# Patient Record
Sex: FEMALE | Race: BLACK OR AFRICAN AMERICAN | NOT HISPANIC OR LATINO | URBAN - METROPOLITAN AREA
[De-identification: names, ages, dates, MRNs, and addresses within clinical notes are randomized per-mention and may not be internally consistent; named-entity substitution may affect disease eponyms.]

---

## 2017-12-22 ENCOUNTER — EMERGENCY (EMERGENCY)
Facility: HOSPITAL | Age: 42
LOS: 1 days | Discharge: HOME | End: 2017-12-22
Attending: EMERGENCY MEDICINE | Admitting: EMERGENCY MEDICINE
Payer: OTHER MISCELLANEOUS

## 2017-12-22 VITALS
TEMPERATURE: 98 F | DIASTOLIC BLOOD PRESSURE: 89 MMHG | SYSTOLIC BLOOD PRESSURE: 141 MMHG | HEART RATE: 80 BPM | OXYGEN SATURATION: 100 % | RESPIRATION RATE: 14 BRPM

## 2017-12-22 VITALS
HEART RATE: 98 BPM | OXYGEN SATURATION: 100 % | SYSTOLIC BLOOD PRESSURE: 147 MMHG | RESPIRATION RATE: 16 BRPM | DIASTOLIC BLOOD PRESSURE: 81 MMHG | TEMPERATURE: 98 F

## 2017-12-22 PROCEDURE — 99284 EMERGENCY DEPT VISIT MOD MDM: CPT | Mod: 25

## 2017-12-22 PROCEDURE — 99053 MED SERV 10PM-8AM 24 HR FAC: CPT

## 2017-12-22 RX ORDER — ERYTHROMYCIN BASE 5 MG/GRAM
1 OINTMENT (GRAM) OPHTHALMIC (EYE)
Qty: 1 | Refills: 0
Start: 2017-12-22 | End: 2017-12-28

## 2017-12-22 RX ORDER — ACETAMINOPHEN 500 MG
650 TABLET ORAL ONCE
Qty: 0 | Refills: 0 | Status: COMPLETED | OUTPATIENT
Start: 2017-12-22 | End: 2017-12-22

## 2017-12-22 RX ORDER — CIPROFLOXACIN HCL 0.3 %
2 DROPS OPHTHALMIC (EYE)
Qty: 1 | Refills: 0
Start: 2017-12-22 | End: 2017-12-28

## 2017-12-22 RX ORDER — CIPROFLOXACIN HCL 0.3 %
2 DROPS OPHTHALMIC (EYE) ONCE
Qty: 0 | Refills: 0 | Status: COMPLETED | OUTPATIENT
Start: 2017-12-22 | End: 2017-12-22

## 2017-12-22 RX ADMIN — Medication 300 MILLIGRAM(S): at 04:42

## 2017-12-22 RX ADMIN — Medication 2 DROP(S): at 04:42

## 2017-12-22 RX ADMIN — Medication 650 MILLIGRAM(S): at 04:42

## 2017-12-22 NOTE — ED PROVIDER NOTE - PHYSICAL EXAMINATION
normal slit lamp, normal IOP <20, no FB on eyelid eversion, normal fluoroscein, no pain w/ light shining, +conjunctival injection. PERRL, EOMI. +L superior and inferior eyelid swelling, no erythema/warmth. +Fake eyelashes L eye.

## 2017-12-22 NOTE — ED PROVIDER NOTE - ATTENDING CONTRIBUTION TO CARE
42F PMH DM p/w L eye pain/drainage/redness and eyelid swelling since sleeping with contacts. No other systemic complaints. Vitals wnl, exam as above.  ddx: conjunctivitis, possible pre-septal cellulitis. Clinically not orbital cellulitis. Normal acuity and IOP. Clinically no indication for emergent ophtho consult.  Will tx w/ ciprodex, clinda. Pt understands importance of following up w/ ophtho in 1-2 days. Tylenol prn.   Comfortable for dc.

## 2017-12-22 NOTE — ED ADULT TRIAGE NOTE - CHIEF COMPLAINT QUOTE
c/o left eye redness, irritation and swelling since last night. no purulent drainage. "just clear tears." denies vision change or any other symptoms.

## 2017-12-22 NOTE — ED ADULT NURSE NOTE - OBJECTIVE STATEMENT
Pt received to room 24 AA&OX3 c/o left eye swelling, tearing and pain. Pt reports recently eye lash extension. Denies trauma. No labs or IV access needed. Vital signs noted. Will continue to monitor.

## 2017-12-22 NOTE — ED PROVIDER NOTE - MEDICAL DECISION MAKING DETAILS
42yof contact lens wearer w/ left eye pain, foreign body sensation, periorbital edema. Likely conjunctivitis, +/- preseptal cellulitis. No pain or resistance to retropulsion, and no systemic complaints to suggest orbital cellulitis. keratitis is on the differential given pt's contact lens wearing but there are no opacities or ulcerations over the sclera. Will treat w/ PO and topical antibiotics, pt will see her ophthalmologist today. Provided w/ clinic number as well for urgent follow up.

## 2019-05-10 ENCOUNTER — APPOINTMENT (OUTPATIENT)
Dept: ORTHOPEDIC SURGERY | Facility: CLINIC | Age: 44
End: 2019-05-10
Payer: COMMERCIAL

## 2019-05-10 PROCEDURE — 99204 OFFICE O/P NEW MOD 45 MIN: CPT

## 2019-05-10 PROCEDURE — 72170 X-RAY EXAM OF PELVIS: CPT

## 2019-05-10 PROCEDURE — 72100 X-RAY EXAM L-S SPINE 2/3 VWS: CPT

## 2019-05-10 RX ORDER — OXYCODONE HYDROCHLORIDE 5 MG/1
5 CAPSULE ORAL
Qty: 42 | Refills: 0 | Status: DISCONTINUED | COMMUNITY
Start: 2019-05-10 | End: 2019-05-10

## 2019-05-13 ENCOUNTER — APPOINTMENT (OUTPATIENT)
Dept: ORTHOPEDIC SURGERY | Facility: CLINIC | Age: 44
End: 2019-05-13
Payer: COMMERCIAL

## 2019-05-13 VITALS
BODY MASS INDEX: 35.68 KG/M2 | DIASTOLIC BLOOD PRESSURE: 86 MMHG | HEART RATE: 77 BPM | SYSTOLIC BLOOD PRESSURE: 120 MMHG | WEIGHT: 189 LBS | HEIGHT: 61 IN

## 2019-05-13 DIAGNOSIS — Z78.9 OTHER SPECIFIED HEALTH STATUS: ICD-10-CM

## 2019-05-13 DIAGNOSIS — Z86.39 PERSONAL HISTORY OF OTHER ENDOCRINE, NUTRITIONAL AND METABOLIC DISEASE: ICD-10-CM

## 2019-05-13 DIAGNOSIS — M51.26 OTHER INTERVERTEBRAL DISC DISPLACEMENT, LUMBAR REGION: ICD-10-CM

## 2019-05-13 PROCEDURE — 99215 OFFICE O/P EST HI 40 MIN: CPT

## 2019-05-14 ENCOUNTER — OTHER (OUTPATIENT)
Age: 44
End: 2019-05-14

## 2019-05-15 NOTE — PHYSICAL EXAM
[de-identified] : On general examination the patient is adequately groomed and nourished. The vital parameters are as recorded. \par There is no lymphedema or diffuse swelling, no varicose veins, no skin warmth/erythema/scars/swelling, no ulcers and no palpable lymph nodes or masses in both lower extremities. Bilateral pedal pulses are well palpable.\par Upper Extremity:\par Both right and left upper extremities are unremarkable in terms of skin rash, lesions, pigmentation, redness, tenderness, swelling, joint instability, abnormal deformity or crepitus. The overall range of motion, sensation, motor tone and strength testing are normal.\par \par Spine Exam:\par There is no curvature of the spine with loss of normal lumbar lordosis secondary to spasm. The skin is devoid of erythema, scars, pigmentation or rashes. There is mild lumbar spasm and tenderness especially at L4-L5 or L5-S1. \par The range of motion of the lumbar spine is limited by pain and spasm. Forward flexion is 70% normal, extension catch positive, lateral flexion and rotation 70% normal. There is no lumbar spine imbalance or instability detected.\par Bilateral passive SLR is right 40 degrees, left 40 degrees in supine and sitting positions. +SLR left sided.  \par Neurology:\par The patient is alert and oriented in person, place and time. The mood is calm and affect is normal.\par Testing for coordination including Rhomberg's Test and Finger-Nose Test, sensation, motor tone and power and deep tendon reflexes in both lower extremities is normal.\par  [de-identified] : The following radiographs were ordered and read by me during this patients visit. I reviewed each radiograph in detail with the patient and discussed the findings as highlighted below. \par AP view of the pelvis is within normal limits. \par AP and lateral views of the lumbar spine reveal DJD L5S1 with loss of normal lordosis\par \par

## 2019-05-15 NOTE — DISCUSSION/SUMMARY
[de-identified] : Lumbar spine spasm,possible herniation. \par The patient was informed of the findings and recommended conservative management in the form of a home exercise program, activity modifications. I have recommended an MRI of the lumbar spine to rule out HNP. \par A prescription for non-steroidal anti-inflammatory medication - diclofenac,muscle relaxer - Cyclobenzaprine, as well as Tramadol and oxycodone for pain as needed was providend and the risks, benefits and side effects were discussed.\par I have provided the patient with a referral to Dr. Siddiqi for evaluation of the lumbar spine. She does not require routine follow up with me. \par \par

## 2019-05-15 NOTE — HISTORY OF PRESENT ILLNESS
[de-identified] : Ms. VIVIEN HIGHTOWER is a 43 year old female presents with lower back pain, intermittent for many years. She notes it would come and go, and usually resolve in a day or two, but notes that about One week ago, she bent over to  a case of water, and could not stand back up. She admits to continued severe pain to the lower back, with radiation to the left LE. She states she has had tingling in the finger tips and toes, but notes she has a history of diabetes, and is not sure if it is related to that. She denies loss of sensation, loss of balance. She states that her only comfortable position is laying prone. She denies bowel and bladder incontinence. She was seen in the ED following the exacerbation of pain last week, and she was provided with prescriptions for Tramadol, ibuprofen and methocarbamol. She has been taking the medications and notes very little relief.  [Worsening] : worsening [7] : a current pain level of 7/10 [Walking] : worsened by walking [Rest] : relieved by rest

## 2019-10-29 ENCOUNTER — OUTPATIENT (OUTPATIENT)
Dept: OUTPATIENT SERVICES | Facility: HOSPITAL | Age: 44
LOS: 1 days | End: 2019-10-29
Payer: COMMERCIAL

## 2019-10-29 ENCOUNTER — APPOINTMENT (OUTPATIENT)
Dept: BARIATRICS | Facility: CLINIC | Age: 44
End: 2019-10-29
Payer: COMMERCIAL

## 2019-10-29 VITALS
HEIGHT: 61 IN | DIASTOLIC BLOOD PRESSURE: 80 MMHG | BODY MASS INDEX: 34.13 KG/M2 | OXYGEN SATURATION: 99 % | HEART RATE: 83 BPM | WEIGHT: 180.78 LBS | SYSTOLIC BLOOD PRESSURE: 118 MMHG

## 2019-10-29 DIAGNOSIS — E11.9 TYPE 2 DIABETES MELLITUS WITHOUT COMPLICATIONS: ICD-10-CM

## 2019-10-29 DIAGNOSIS — Z87.898 PERSONAL HISTORY OF OTHER SPECIFIED CONDITIONS: ICD-10-CM

## 2019-10-29 DIAGNOSIS — D25.9 LEIOMYOMA OF UTERUS, UNSPECIFIED: ICD-10-CM

## 2019-10-29 DIAGNOSIS — R14.1 GAS PAIN: ICD-10-CM

## 2019-10-29 DIAGNOSIS — E66.01 MORBID (SEVERE) OBESITY DUE TO EXCESS CALORIES: ICD-10-CM

## 2019-10-29 DIAGNOSIS — Z98.84 BARIATRIC SURGERY STATUS: ICD-10-CM

## 2019-10-29 PROCEDURE — 74220 X-RAY XM ESOPHAGUS 1CNTRST: CPT

## 2019-10-29 PROCEDURE — 99204 OFFICE O/P NEW MOD 45 MIN: CPT | Mod: 25

## 2019-10-29 PROCEDURE — 74220 X-RAY XM ESOPHAGUS 1CNTRST: CPT | Mod: 26

## 2019-10-29 PROCEDURE — S2083 ADJUSTMENT GASTRIC BAND: CPT

## 2019-10-30 NOTE — HISTORY OF PRESENT ILLNESS
[Procedure: ___] : Procedure performed: [unfilled]  [de-identified] : 43 year old female s/p LAGB presents today with dysphagia and emesis with solids and gassy. Over the past month, she developed worsening dysphagia to solids and now mostly having soups and soft foods including carbs. Also, patient reports occasional acid reflux symptoms and denies nocturnal reflux. She is having adequate water daily. She was in [facility] ED last month with chest pain and had it was determined to be gas pain. She was seen by Bariatric team and no fluid was removed from lap band at that time. Patient is concerned that the lap band might need to be taken out and if it does, can it be done in conjunction with abdominal hysterectomy for uterine fibroids and abdominoplasty. VE performed today.

## 2019-10-30 NOTE — REVIEW OF SYSTEMS
[Recent Change In Weight] : ~T recent weight change [Dysphagia] : dysphagia [Abdominal Pain] : abdominal pain [Vomiting] : vomiting [Constipation] : constipation [Reflux/Heartburn] : reflux/heartburn [Negative] : Psychiatric [Fever] : no fever [Chills] : no chills [Night Sweats] : no night sweats [Fatigue] : no fatigue [Eye Pain] : no eye pain [Red Eyes] : eyes not red [Vision Problems] : no vision problems [Hoarseness] : no hoarseness [Odynophagia] : no odynophagia [Chest Pain] : no chest pain [Palpitations] : no palpitations [Leg Claudication] : no intermittent leg claudication [Shortness Of Breath] : no shortness of breath [Wheezing] : no wheezing [Cough] : no cough [FreeTextEntry2] : weight loss

## 2019-10-30 NOTE — PROCEDURE
[FreeTextEntry1] : LAP-BAND adjustment was performed today. Using standard sterile technique, a 20-gauge Stein needle was inserted into the LAB-BAND port.  This was accessed with a single puncture. 5 ml were aspirated and 5 ml removed. The resulting net volume remains 0 ml. \par After the procedure, the patient was seated erect and tolerated water test without symptoms of reflux, dysphagia or regurgitation. \par Routine post adjustment nutritional counseling was provided.  This patient should adhere to a liquid diet for the next 24-48 hours advancing one stage per day to regular as tolerated. \par

## 2019-10-30 NOTE — ASSESSMENT
[FreeTextEntry1] : 3 year old female s/p LAGB presents today with dysphagia and emesis with solids and gassy. VE showed no prolapse or obstruction. However given symptoms, a COMPLETE lap band DEFLATION was performed today without any complications. Patient tolerated procedure and post water test. Routine post adjustment nutritional counseling was provided.  This patient should adhere to a liquid diet for the next 24-48 hours advancing one stage per day to regular as tolerated. The patient was encouraged to continue a low fat, low carbohydrate and high protein diet and limit grazing. \par \par Nutrition and exercise counseling provided\par Follow up in 1 month or sooner if there is no improvement in symptoms\par Call with any questions or concerns\par

## 2019-10-30 NOTE — PHYSICAL EXAM
[Obese, well nourished, in no acute distress] : obese, well nourished, in no acute distress [Normal] : affect appropriate [de-identified] : EOMI, anicteric  [de-identified] : Soft and nontender, no hepatosplenomegaly, masses or hernias appreciated. Lap-band port site without erythema or tenderness.

## 2019-12-06 NOTE — ED PROVIDER NOTE - OBJECTIVE STATEMENT
42yof w/ DM2 p/w left eye pain and swelling. Detail Level: Detailed 42yof w/ DM2 p/w left eye pain and swelling. Gradual onset yesterday of irritation, foreign body sensation and redness of the left eye which the patient initially attributed to sleeping with her contacts in. This evening she began to have increasing pain and swelling to the left eyelid, now associated with photophobia and tearing. Denies loss of visual acuity, painful eye movements, retroorbital pressure. Denies any fevers, chills or other systemic complaints. Denies any trauma to the eye. 42yof w/ DM2 p/w left eye pain and swelling. Gradual onset yesterday of irritation, foreign body sensation and redness of the left eye which the patient initially attributed to sleeping with her contacts in. This evening she began to have increasing pain and swelling to the left eyelid, now associated with photophobia and tearing. Denies loss of visual acuity, painful eye movements, retroorbital pressure. Denies any fevers, chills or other systemic complaints. Denies any trauma to the eye.   [place]: 42F PMH DM p/w L eye pain. Slept in contacts and removed them yesterday morning. Now w/ increasing pain to L eye, increased tearing. Also increased eyelid swelling. no visual changes, f/c, rashes, joint pains, trauma.

## 2020-01-28 ENCOUNTER — APPOINTMENT (OUTPATIENT)
Dept: BARIATRICS | Facility: CLINIC | Age: 45
End: 2020-01-28
Payer: COMMERCIAL

## 2020-01-28 VITALS
OXYGEN SATURATION: 98 % | DIASTOLIC BLOOD PRESSURE: 90 MMHG | WEIGHT: 183.42 LBS | HEIGHT: 61 IN | HEART RATE: 82 BPM | SYSTOLIC BLOOD PRESSURE: 130 MMHG | BODY MASS INDEX: 34.63 KG/M2

## 2020-01-28 DIAGNOSIS — Z87.898 PERSONAL HISTORY OF OTHER SPECIFIED CONDITIONS: ICD-10-CM

## 2020-01-28 DIAGNOSIS — Z87.19 PERSONAL HISTORY OF OTHER DISEASES OF THE DIGESTIVE SYSTEM: ICD-10-CM

## 2020-01-28 DIAGNOSIS — E66.9 OBESITY, UNSPECIFIED: ICD-10-CM

## 2020-01-28 PROCEDURE — 99214 OFFICE O/P EST MOD 30 MIN: CPT

## 2020-01-28 RX ORDER — CYCLOBENZAPRINE HYDROCHLORIDE 5 MG/1
5 TABLET, FILM COATED ORAL 3 TIMES DAILY
Qty: 60 | Refills: 0 | Status: DISCONTINUED | COMMUNITY
Start: 2019-05-10 | End: 2020-01-28

## 2020-01-28 RX ORDER — TRAMADOL HYDROCHLORIDE 50 MG/1
50 TABLET, COATED ORAL
Qty: 21 | Refills: 1 | Status: DISCONTINUED | COMMUNITY
Start: 2019-05-10 | End: 2020-01-28

## 2020-01-28 RX ORDER — OXYCODONE 5 MG/1
5 TABLET ORAL
Qty: 42 | Refills: 0 | Status: DISCONTINUED | OUTPATIENT
Start: 2019-05-10 | End: 2020-01-28

## 2020-02-10 PROBLEM — E66.9 OBESE: Status: ACTIVE | Noted: 2020-01-28

## 2020-02-12 NOTE — ASSESSMENT
[FreeTextEntry1] : 44 year old female s/p LAGB presents today to discuss possible lap band removal in conjunction with abdominal hysterectomy for uterine fibroids and abdominoplasty. Since patient is not having any issues related to the lap band, it was recommended that she NOT have it removed at this time. With regard to utilizing the lap band, it was suggested that she food journal and make nutritionist appointment. The patient was encouraged to have a low fat, low carbohydrate and high protein diet including three meals and limit grazing.\par \par Nutrition and exercise counseling provided\par Food journal and nutritionist appointment\par Follow up in 2-3 months\par Call with any questions or concerns\par

## 2020-02-12 NOTE — REVIEW OF SYSTEMS
[Recent Change In Weight] : ~T recent weight change [Negative] : Psychiatric [Fever] : no fever [Chills] : no chills [Fatigue] : no fatigue [Night Sweats] : no night sweats [Eye Pain] : no eye pain [Red Eyes] : eyes not red [Vision Problems] : no vision problems [Dysphagia] : no dysphagia [Hoarseness] : no hoarseness [Chest Pain] : no chest pain [Odynophagia] : no odynophagia [Leg Claudication] : no intermittent leg claudication [Palpitations] : no palpitations [Shortness Of Breath] : no shortness of breath [Wheezing] : no wheezing [Abdominal Pain] : no abdominal pain [Cough] : no cough [Vomiting] : no vomiting [Constipation] : no constipation [Reflux/Heartburn] : no reflux/ heartburn [FreeTextEntry2] : weight gain

## 2020-02-12 NOTE — HISTORY OF PRESENT ILLNESS
[Procedure: ___] : Procedure performed: [unfilled]  [de-identified] : 44 year old female s/p LAGB presents today to discuss possible lap band removal in conjunction with abdominal hysterectomy for uterine fibroids and abdominoplasty. She gained 3 lbs since last visit.  Patient reports that since last visit, all previous symptoms resolved and she can tolerate all foods now with the lap band completely deflated. She is hesitant to have it removed because she believes that she will gain more weight without it. Based on brief diet history, she is not always having three protein rich meals a day and is having adequate water daily.  She snacks during the day and misses meals. She denies recent stuck episodes, acid reflux symptoms, nausea, vomiting, diarrhea and constipation.

## 2020-02-12 NOTE — PHYSICAL EXAM
[Obese, well nourished, in no acute distress] : obese, well nourished, in no acute distress [Normal] : affect appropriate [de-identified] : EOMI, anicteric  [de-identified] : Soft and nontender, no hepatosplenomegaly, masses or hernias appreciated. Lap-band port site without erythema or tenderness.

## 2020-03-24 ENCOUNTER — APPOINTMENT (OUTPATIENT)
Dept: BARIATRICS/WEIGHT MGMT | Facility: CLINIC | Age: 45
End: 2020-03-24

## 2020-04-06 ENCOUNTER — APPOINTMENT (OUTPATIENT)
Dept: DISASTER EMERGENCY | Facility: CLINIC | Age: 45
End: 2020-04-06

## 2020-04-25 ENCOUNTER — MESSAGE (OUTPATIENT)
Age: 45
End: 2020-04-25

## 2022-05-31 ENCOUNTER — NON-APPOINTMENT (OUTPATIENT)
Age: 47
End: 2022-05-31

## 2022-05-31 ENCOUNTER — OUTPATIENT (OUTPATIENT)
Dept: OUTPATIENT SERVICES | Facility: HOSPITAL | Age: 47
LOS: 1 days | End: 2022-05-31
Payer: COMMERCIAL

## 2022-05-31 ENCOUNTER — APPOINTMENT (OUTPATIENT)
Dept: BARIATRICS | Facility: CLINIC | Age: 47
End: 2022-05-31

## 2022-05-31 VITALS
TEMPERATURE: 96.4 F | BODY MASS INDEX: 34.96 KG/M2 | DIASTOLIC BLOOD PRESSURE: 84 MMHG | HEIGHT: 61 IN | HEART RATE: 89 BPM | OXYGEN SATURATION: 92 % | SYSTOLIC BLOOD PRESSURE: 128 MMHG | WEIGHT: 185.19 LBS

## 2022-05-31 DIAGNOSIS — R13.10 DYSPHAGIA, UNSPECIFIED: ICD-10-CM

## 2022-05-31 DIAGNOSIS — Z98.84 BARIATRIC SURGERY STATUS: ICD-10-CM

## 2022-05-31 PROCEDURE — 74220 X-RAY XM ESOPHAGUS 1CNTRST: CPT

## 2022-05-31 PROCEDURE — 99214 OFFICE O/P EST MOD 30 MIN: CPT

## 2022-05-31 PROCEDURE — 74220 X-RAY XM ESOPHAGUS 1CNTRST: CPT | Mod: 26

## 2022-05-31 RX ORDER — DULAGLUTIDE 1.5 MG/.5ML
1.5 INJECTION, SOLUTION SUBCUTANEOUS
Refills: 0 | Status: ACTIVE | COMMUNITY

## 2022-05-31 RX ORDER — INSULIN GLARGINE 300 U/ML
300 INJECTION, SOLUTION SUBCUTANEOUS
Refills: 0 | Status: ACTIVE | COMMUNITY

## 2022-05-31 RX ORDER — CANAGLIFLOZIN 300 MG/1
TABLET, FILM COATED ORAL
Refills: 0 | Status: DISCONTINUED | COMMUNITY
End: 2022-05-31

## 2022-05-31 RX ORDER — MULTIVITAMIN
TABLET ORAL
Refills: 0 | Status: ACTIVE | COMMUNITY

## 2022-05-31 NOTE — REVIEW OF SYSTEMS
[Abdominal Pain] : abdominal pain [Vomiting] : vomiting [Constipation] : constipation [Negative] : Allergic/Immunologic

## 2022-05-31 NOTE — REASON FOR VISIT
[Initial Consult] : an initial consult for [S/P Bariatric Surgery] : s/p bariatric surgery [Nausea/Vomiting] : nausea/vomiting [Abdominal Pain] : abdominal pain [Other___] : [unfilled]

## 2022-10-18 NOTE — PHYSICAL EXAM
[Normal] : affect appropriate [de-identified] : soft, NT, ND, normoactive bowel sounds, no hepatosplenomegaly or masses or diastasis appreciated\par incisions well healed, no drainage or bleeding  complains of pain/discomfort

## 2022-10-18 NOTE — HISTORY OF PRESENT ILLNESS
[Procedure: ___] : Procedure performed: [unfilled]  [Date of Surgery: ___] : Date of Surgery:   [unfilled] [Surgeon Name:   ___] : Surgeon Name: Dr. ANDERSON [de-identified] : 46 year old F is 10 years s/p lap band . Pt presents today for 3 months of constipation and generalized abdominal pain. Pt adds feels uncomfortable after eating like stuck episodes and then have have nausea and vomiting.  No reflux. States symptoms started when started trulicity by Endocrinologist for diabetic management.  Weight stable since last visit January 2020.  Patient is consuming only one meal/day, as not hungry. Reports no snacking Pt is tolerating textured and solid foods without any issues. Drinking adequate zero calorie liquid 64 oz/day, urine color is transparent yellow. Reports BM 3 x/wk when on trulicity and taking colace, dulcolax, metamucil and occasional magnesium citrate. Pt did trial not taking truliticy since 5/7/2022 and then had BM daily without taking laxatives/stool softeners. Pt is taking vitamin supplements as directed. Pt adds looking to have liposuction in the near future, did have abdominoplasty 3/28/2011.\par \par Pt adds works overnights as RN. Had questions if band should be removed.\par  [Pre-Op Weight ___] : Pre-op weight was [unfilled] lbs

## 2022-10-18 NOTE — ASSESSMENT
[FreeTextEntry1] : 46 year old F is 10 years s/p lap band. Pt presents today for 3 months of constipation and generalized abdominal pain. Pt adds that she sometimes feels uncomfortable after eating like a stuck episodes and may have nausea and vomiting. States symptoms started when started trulicity by Endocrinologist for diabetic management. Current weight 185 lbs, weight stable since last visit.\par Constipation felt to be secondary to trulicity.\par \par VE today showed no obstruction, prolapse, dilatation, or delayed transit time\par \par Reviewed guidelines about nutrition - protein focus diet\par Encouraged good food choices - maintain food log\par Increase dietary fiber intake\par Discussed constipation remedies with the patient\par Continue MVI and daily zero calorie liquid intake 64 oz/day\par Track steps - goal approximately 8-10k steps per day\par Discussed with pt option to have band removed including details of the procedure and risk/complications \par 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 Coronel\par \par Additional time spent before and after visit reviewing chart

## 2022-11-16 ENCOUNTER — APPOINTMENT (OUTPATIENT)
Dept: BARIATRICS | Facility: CLINIC | Age: 47
End: 2022-11-16

## 2022-12-02 ENCOUNTER — APPOINTMENT (OUTPATIENT)
Dept: BARIATRICS | Facility: CLINIC | Age: 47
End: 2022-12-02

## 2022-12-02 VITALS
DIASTOLIC BLOOD PRESSURE: 78 MMHG | HEIGHT: 61 IN | HEART RATE: 86 BPM | WEIGHT: 186.29 LBS | BODY MASS INDEX: 35.17 KG/M2 | OXYGEN SATURATION: 98 % | SYSTOLIC BLOOD PRESSURE: 130 MMHG | TEMPERATURE: 97 F

## 2022-12-02 DIAGNOSIS — K59.09 OTHER CONSTIPATION: ICD-10-CM

## 2022-12-02 PROCEDURE — 99214 OFFICE O/P EST MOD 30 MIN: CPT

## 2022-12-02 RX ORDER — DICLOFENAC SODIUM 75 MG/1
75 TABLET, DELAYED RELEASE ORAL
Qty: 1 | Refills: 1 | Status: DISCONTINUED | COMMUNITY
Start: 2019-05-10 | End: 2022-12-02

## 2022-12-02 NOTE — REASON FOR VISIT
[Follow-Up Visit] : a follow-up visit for [S/P Bariatric Surgery] : s/p bariatric surgery [Nausea/Vomiting] : nausea/vomiting [Abdominal Pain] : abdominal pain [Other___] : [unfilled]

## 2022-12-05 NOTE — PHYSICAL EXAM
[Normal] : affect appropriate [de-identified] : soft, NT, ND, no masses or diastasis appreciated, port to LUQ without tenderness, erythema, or swelling

## 2022-12-05 NOTE — ASSESSMENT
[FreeTextEntry1] : 47 year old F is 10 years s/p lap band. Presents today for follow up of last visit to discuss if lab band should be removed. VE 5/31/2022 was normal. Reports still having same symptoms of stuck episodes with solids, occasionally resulting in nausea and vomiting to relieve discomfort. No reflux. Also reports same constipation, taking Colace, Dulcolax, Metamucil and occasional magnesium citrate. Weight stable since last visit.\par \par Reviewed guidelines about nutrition - protein focus diet, try to get 3 meal/day\par Soft/puree diet and avoid snacking on pretzels/chips\par Encouraged good food choices - maintain food log\par Increase dietary fiber intake\par Discussed constipation remedies with the patient\par Continue daily zero calorie liquid intake 64 oz/day\par Track steps - goal approximately 8-10k steps per day\par \par Discussed with pt option to have band removed - Return to office in 1 month to see Dr. 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

## 2022-12-05 NOTE — HISTORY OF PRESENT ILLNESS
[Procedure: ___] : Procedure performed: [unfilled]  [Date of Surgery: ___] : Date of Surgery:   [unfilled] [Surgeon Name:   ___] : Surgeon Name: Dr. ANDERSON [Pre-Op Weight ___] : Pre-op weight was [unfilled] lbs [de-identified] : 47 year old F is 10 years s/p lap band. Presents today for follow up of last visit to discuss if lab band should be removed. VE 5/31/2022 was normal. Reports still having same symptoms of stuck episodes with solids, occasionally resulting in nausea and vomiting to relieve discomfort. No reflux. Also reports same constipation, taking Colace, Dulcolax, Metamucil and occasional magnesium citrate. Weight stable since last visit. 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 Drinking adequate zero calorie liquid >64 oz/day. Taking over the counter vitamins daily. Sleeping 3-4hr/day. Trying to exercise, goes to gym 2x/wk.\par \par Pt interested in liposuction in the near future, did have abdominoplasty 3/28/2011.

## 2022-12-19 NOTE — DATA REVIEWED
Patient would like a referral to infectious disease. She is also questioning if she has anemia?     Referral placed.     Dr. Clay can you please advise on above?   [FreeTextEntry1] : EXAM: ESOPHAGUS \par \par \par PROCEDURE DATE: 10/29/2019 \par \par \par INTERPRETATION: Esophagram \par \par History: Gastric banding approximately eight years ago with complaints of \par gas and bloating for one month \par \par Technique: A single contrast cine esophagram was performed with the patient \par standing. \par \par Interpretation: Esophagus is normal in course, caliber and mucosal pattern. \par Gastric band is present with mild narrowing of the stomach lumen at that \par level. There was prompt passage of contrast material through the band. \par Subsequent barium entering the stomach showed contrast material layering \par beyond the edge of the the gastric band device. \par \par The cine loop is available for clinical review. \par \par \par \par SHAN CLEMONS M.D., ATTENDING RADIOLOGIST \par This document has been electronically signed. Oct 29 2019 3:30PM \par

## 2023-01-06 ENCOUNTER — APPOINTMENT (OUTPATIENT)
Dept: BARIATRICS | Facility: CLINIC | Age: 48
End: 2023-01-06
Payer: COMMERCIAL

## 2023-01-06 VITALS
TEMPERATURE: 97 F | SYSTOLIC BLOOD PRESSURE: 128 MMHG | WEIGHT: 183.2 LBS | BODY MASS INDEX: 34.59 KG/M2 | OXYGEN SATURATION: 98 % | HEART RATE: 84 BPM | HEIGHT: 61 IN | DIASTOLIC BLOOD PRESSURE: 74 MMHG

## 2023-01-06 DIAGNOSIS — R13.10 DYSPHAGIA, UNSPECIFIED: ICD-10-CM

## 2023-01-06 DIAGNOSIS — R11.2 NAUSEA WITH VOMITING, UNSPECIFIED: ICD-10-CM

## 2023-01-06 DIAGNOSIS — Z98.84 BARIATRIC SURGERY STATUS: ICD-10-CM

## 2023-01-06 PROCEDURE — 99215 OFFICE O/P EST HI 40 MIN: CPT

## 2023-01-09 NOTE — PHYSICAL EXAM
[Normal] : affect appropriate [de-identified] : soft, NT, ND, no masses or diastasis appreciated, port to LUQ without tenderness, erythema, or swelling

## 2023-01-09 NOTE — ASSESSMENT
[FreeTextEntry1] : 47 year old F is 11 years s/p lap band. Presents today to discuss lab band removal for symptoms of stuck episodes with solids, occasionally resulting in nausea and vomiting to relieve discomfort. No reflux. Weight stable since last visit. Preop weight before band 236 lbs, with lowest weight 168 lbs. Pt consuming more liquids, soft wheat bread that can get down easily.\par \par Reviewed guidelines about nutrition - protein focus diet, try to get 3 meal/day\par Soft/puree diet \par Encouraged good food choices - maintain food log\par Increase dietary fiber intake\par Increase daily zero calorie liquid intake 64 oz/day\par Track steps - goal approximately 8-10k steps per day\par \par Discussed with pt benefits and risks for band removal\par Schedule OR date\par GI colonoscopy scheduled 2/8/2023\par Nutritionist Sola Penaloza appointment \par All questions answered\par \par Pt seen with Dr. Coronel\par \par Additional time spent before and after visit reviewing chart

## 2023-01-09 NOTE — HISTORY OF PRESENT ILLNESS
[Procedure: ___] : Procedure performed: [unfilled]  [Date of Surgery: ___] : Date of Surgery:   [unfilled] [Surgeon Name:   ___] : Surgeon Name: Dr. ANDERSON [Pre-Op Weight ___] : Pre-op weight was [unfilled] lbs [de-identified] : 47 year old F is 11 years s/p lap band. Presents today to discuss lab band removal for symptoms of stuck episodes with solids, occasionally resulting in nausea and vomiting to relieve discomfort. No reflux. Weight stable since last visit. Preop weight before band 236 lbs, with lowest weight 168 lbs. Consuming only one meal/day, as not hungry and avoiding symptoms. Pt consuming more liquids, soft wheat bread that can get down easily. Drinking adequate zero calorie liquid 40oz/day. Taking over the counter vitamins but not consistently. Also reports same constipation, taking prunes. Sleeping 4-5hr/day. Trying to exercise, goes to gym 2x/wk.\par \par Saw GI Dr. Wadatski, had UGIS with negative findings\par \par VE 5/31/2022 was normal. Band emptied 10/29/2019\par \par Pt interested in liposuction in the near future, did have abdominoplasty 3/28/2011 (after lap band)

## 2023-02-02 ENCOUNTER — OUTPATIENT (OUTPATIENT)
Dept: OUTPATIENT SERVICES | Facility: HOSPITAL | Age: 48
LOS: 1 days | End: 2023-02-02
Payer: COMMERCIAL

## 2023-02-02 VITALS
HEART RATE: 80 BPM | SYSTOLIC BLOOD PRESSURE: 120 MMHG | TEMPERATURE: 97 F | OXYGEN SATURATION: 99 % | WEIGHT: 184.97 LBS | HEIGHT: 61 IN | RESPIRATION RATE: 14 BRPM | DIASTOLIC BLOOD PRESSURE: 80 MMHG

## 2023-02-02 DIAGNOSIS — R11.2 NAUSEA WITH VOMITING, UNSPECIFIED: ICD-10-CM

## 2023-02-02 DIAGNOSIS — R13.10 DYSPHAGIA, UNSPECIFIED: ICD-10-CM

## 2023-02-02 DIAGNOSIS — Z01.818 ENCOUNTER FOR OTHER PREPROCEDURAL EXAMINATION: ICD-10-CM

## 2023-02-02 DIAGNOSIS — Z98.84 BARIATRIC SURGERY STATUS: Chronic | ICD-10-CM

## 2023-02-02 DIAGNOSIS — Z98.890 OTHER SPECIFIED POSTPROCEDURAL STATES: Chronic | ICD-10-CM

## 2023-02-02 DIAGNOSIS — K95.09 OTHER COMPLICATIONS OF GASTRIC BAND PROCEDURE: ICD-10-CM

## 2023-02-02 DIAGNOSIS — R94.31 ABNORMAL ELECTROCARDIOGRAM [ECG] [EKG]: ICD-10-CM

## 2023-02-02 DIAGNOSIS — Z90.710 ACQUIRED ABSENCE OF BOTH CERVIX AND UTERUS: Chronic | ICD-10-CM

## 2023-02-02 LAB
ANION GAP SERPL CALC-SCNC: 3 MMOL/L — LOW (ref 5–17)
BLD GP AB SCN SERPL QL: SIGNIFICANT CHANGE UP
BUN SERPL-MCNC: 17 MG/DL — SIGNIFICANT CHANGE UP (ref 7–23)
CALCIUM SERPL-MCNC: 9.6 MG/DL — SIGNIFICANT CHANGE UP (ref 8.4–10.5)
CHLORIDE SERPL-SCNC: 106 MMOL/L — SIGNIFICANT CHANGE UP (ref 96–108)
CO2 SERPL-SCNC: 32 MMOL/L — HIGH (ref 22–31)
CREAT SERPL-MCNC: 1.18 MG/DL — SIGNIFICANT CHANGE UP (ref 0.5–1.3)
EGFR: 58 ML/MIN/1.73M2 — LOW
GLUCOSE SERPL-MCNC: 127 MG/DL — HIGH (ref 70–99)
HCT VFR BLD CALC: 38.1 % — SIGNIFICANT CHANGE UP (ref 34.5–45)
HGB BLD-MCNC: 12.9 G/DL — SIGNIFICANT CHANGE UP (ref 11.5–15.5)
MCHC RBC-ENTMCNC: 30.7 PG — SIGNIFICANT CHANGE UP (ref 27–34)
MCHC RBC-ENTMCNC: 33.9 GM/DL — SIGNIFICANT CHANGE UP (ref 32–36)
MCV RBC AUTO: 90.7 FL — SIGNIFICANT CHANGE UP (ref 80–100)
NRBC # BLD: 0 /100 WBCS — SIGNIFICANT CHANGE UP (ref 0–0)
PLATELET # BLD AUTO: 314 K/UL — SIGNIFICANT CHANGE UP (ref 150–400)
POTASSIUM SERPL-MCNC: 5.2 MMOL/L — SIGNIFICANT CHANGE UP (ref 3.5–5.3)
POTASSIUM SERPL-SCNC: 5.2 MMOL/L — SIGNIFICANT CHANGE UP (ref 3.5–5.3)
RBC # BLD: 4.2 M/UL — SIGNIFICANT CHANGE UP (ref 3.8–5.2)
RBC # FLD: 12.2 % — SIGNIFICANT CHANGE UP (ref 10.3–14.5)
SODIUM SERPL-SCNC: 141 MMOL/L — SIGNIFICANT CHANGE UP (ref 135–145)
WBC # BLD: 9.73 K/UL — SIGNIFICANT CHANGE UP (ref 3.8–10.5)
WBC # FLD AUTO: 9.73 K/UL — SIGNIFICANT CHANGE UP (ref 3.8–10.5)

## 2023-02-02 PROCEDURE — G0463: CPT

## 2023-02-02 PROCEDURE — 93005 ELECTROCARDIOGRAM TRACING: CPT

## 2023-02-02 PROCEDURE — XXXXX: CPT | Mod: 1L

## 2023-02-02 RX ORDER — INSULIN GLARGINE 100 [IU]/ML
18 INJECTION, SOLUTION SUBCUTANEOUS
Qty: 0 | Refills: 0 | DISCHARGE

## 2023-02-02 RX ORDER — ROSUVASTATIN CALCIUM 5 MG/1
0 TABLET ORAL
Qty: 0 | Refills: 0 | DISCHARGE

## 2023-02-02 NOTE — H&P PST ADULT - NSICDXPASTMEDICALHX_GEN_ALL_CORE_FT
PAST MEDICAL HISTORY:  DMII (diabetes mellitus, type 2)     Gastric band malfunction     HLD (hyperlipidemia)

## 2023-02-02 NOTE — H&P PST ADULT - NSICDXPASTSURGICALHX_GEN_ALL_CORE_FT
PAST SURGICAL HISTORY:  S/P abdominoplasty     S/P  Section 200o /  /     S/P D&C  /     S/P hysterectomy     S/P repair of ventral hernia     Status post gastric banding

## 2023-02-02 NOTE — H&P PST ADULT - PROBLEM SELECTOR PLAN 1
scheduled for laparoscopic removal of band on 2/22/23  will obtain medical clearance   Pre op instructions   COVID test on 2/29/23 at 2 pm   POCT BS on admit scheduled for laparoscopic removal of band on 2/22/23  will obtain medical clearance   Pre op instructions   COVID test on 2/1923 at 2 pm   POCT BS on admit

## 2023-02-02 NOTE — H&P PST ADULT - PROBLEM SELECTOR PLAN 2
EKG; nonspecific T wave abnormality T wave inversion in inferior leads   refer to cardiologist ; will address EKG in the clearance

## 2023-02-02 NOTE — H&P PST ADULT - HEIGHT IN INCHES
Problem: Skin Integrity:  Goal: Absence of new skin breakdown  Description: Absence of new skin breakdown  3/29/2021 1521 by Yinka Marino RN  Outcome: Met This Shift  3/29/2021 0142 by Mary Noel RN  Outcome: Ongoing     Problem: Safety:  Goal: Free from accidental physical injury  Description: Free from accidental physical injury  Outcome: Met This Shift  Goal: Free from intentional harm  Description: Free from intentional harm  Outcome: Met This Shift     Problem: Daily Care:  Goal: Daily care needs are met  Description: Daily care needs are met  3/29/2021 0142 by Mary Noel RN  Outcome: Met This Shift     Problem: Pain:  Goal: Patient's pain/discomfort is manageable  Description: Patient's pain/discomfort is manageable  Outcome: Met This Shift     Problem: Falls - Risk of:  Goal: Will remain free from falls  Description: Will remain free from falls  3/29/2021 0142 by Mary Noel RN  Outcome: Met This Shift     Problem: Urinary Elimination:  Goal: Ability to recognize the need to void and respond appropriately will improve  Description: Ability to recognize the need to void and respond appropriately will improve  Outcome: Met This Shift     Problem:  Body Temperature - Imbalanced:  Goal: Ability to maintain a body temperature in the normal range will improve  Description: Ability to maintain a body temperature in the normal range will improve  Outcome: Met This Shift     Problem: Nutrition  Goal: Optimal nutrition therapy  3/29/2021 1521 by Yinka Marino RN  Outcome: Met This Shift  3/29/2021 0835 by Danielle Boswell RD, LD  Outcome: Ongoing  Note: Nutrition Problem #1: Severe malnutrition  Intervention: Food and/or Nutrient Delivery: Continue Current Diet  Nutritional Goals: PO >75% meals with adequate oral fluids       Problem: Gas Exchange - Impaired:  Goal: Levels of oxygenation will improve  Description: Levels of oxygenation will improve  Outcome: Met This Shift     Problem: Skin Integrity:  Goal: Will show no infection signs and symptoms  Description: Will show no infection signs and symptoms  3/29/2021 1521 by Aurea Haque RN  Outcome: Ongoing  3/29/2021 0142 by Halle Foley RN  Outcome: Ongoing  Goal: Risk for impaired skin integrity will decrease  Description: Risk for impaired skin integrity will decrease  3/29/2021 0142 by Halle Foley RN  Outcome: Ongoing  Goal: Demonstration of wound healing without infection will improve  Description: Demonstration of wound healing without infection will improve  3/29/2021 0142 by Halle Foley RN  Outcome: Ongoing  Goal: Complications related to intravenous access or infusion will be avoided or minimized  3/29/2021 0142 by Halle Foley RN  Outcome: Ongoing     Problem: Infection:  Goal: Will remain free from infection  Description: Will remain free from infection  3/29/2021 1521 by Aurea Haque RN  Outcome: Ongoing  3/29/2021 0142 by Halle Foley RN  Outcome: Ongoing     Problem: Pain:  Goal: Pain level will decrease  Description: Pain level will decrease  3/29/2021 0142 by Halle Foley RN  Outcome: Ongoing  Goal: Control of chronic pain  Description: Control of chronic pain  3/29/2021 0142 by Halle Foley RN  Outcome: Ongoing     Problem: Skin Integrity:  Goal: Skin integrity will stabilize  Description: Skin integrity will stabilize  3/29/2021 0142 by Halle Foley RN  Outcome: Ongoing     Problem: Mobility - Impaired:  Goal: Mobility will improve  Description: Mobility will improve  Outcome: Ongoing     Problem: Health Behavior:  Goal: Compliance with treatment plan for underlying cause of condition will improve  Description: Compliance with treatment plan for underlying cause of condition will improve  Outcome: Ongoing     Problem: Urinary Elimination:  Goal: Will remain free from infection  Description: Will remain free from infection  3/29/2021 1521 by Aurea Haque RN  Outcome: Ongoing  3/29/2021 0142 by Halle Foley RN  Outcome: Ongoing     Problem: Discharge 1

## 2023-02-02 NOTE — H&P PST ADULT - HISTORY OF PRESENT ILLNESS
48 y/o female who had undergone laparoscopic gastric band placement on 2011 presents with complaint of difficulty swallowing , acid reflex , bloating and constipation since 2 months. Port was deflated 2 years ago due to abdominal discomfort . scheduled for lap removal of lap band on 2/22/23

## 2023-02-02 NOTE — H&P PST ADULT - PRO ARRIVE FROM
Spoke with patient and states her blood sugar was 276 about 2 weeks ago. 6/1/2020 BS was 188. Patient states she did not take her blood sugar yesterday and has not taken yet this morning. Informed patient that Prednisone can make her sugar go up but she stated it usually hasn't run that high and usually runs in the 130's. Informed patient to take blood sugar this morning and call back and will check with Dr. Swartz. Patient verbalized understanding. home

## 2023-02-03 LAB
A1C WITH ESTIMATED AVERAGE GLUCOSE RESULT: 12.2 % — HIGH (ref 4–5.6)
ESTIMATED AVERAGE GLUCOSE: 303 MG/DL — HIGH (ref 68–114)

## 2023-02-07 ENCOUNTER — NON-APPOINTMENT (OUTPATIENT)
Age: 48
End: 2023-02-07

## 2023-02-13 RX ORDER — OMEPRAZOLE 20 MG/1
20 CAPSULE, DELAYED RELEASE ORAL DAILY
Qty: 30 | Refills: 3 | Status: DISCONTINUED | COMMUNITY
Start: 2023-01-23 | End: 2023-02-13

## 2023-02-13 RX ORDER — OXYCODONE 5 MG/1
5 TABLET ORAL
Qty: 6 | Refills: 0 | Status: DISCONTINUED | COMMUNITY
Start: 2023-01-23 | End: 2023-02-13

## 2023-02-22 ENCOUNTER — APPOINTMENT (OUTPATIENT)
Dept: BARIATRICS | Facility: HOSPITAL | Age: 48
End: 2023-02-22

## 2023-03-02 ENCOUNTER — APPOINTMENT (OUTPATIENT)
Dept: BARIATRICS | Facility: CLINIC | Age: 48
End: 2023-03-02

## 2023-03-28 ENCOUNTER — APPOINTMENT (OUTPATIENT)
Dept: BARIATRICS | Facility: CLINIC | Age: 48
End: 2023-03-28

## 2023-04-25 ENCOUNTER — APPOINTMENT (OUTPATIENT)
Dept: BARIATRICS/WEIGHT MGMT | Facility: CLINIC | Age: 48
End: 2023-04-25